# Patient Record
Sex: MALE | Race: WHITE
[De-identification: names, ages, dates, MRNs, and addresses within clinical notes are randomized per-mention and may not be internally consistent; named-entity substitution may affect disease eponyms.]

---

## 2019-08-07 ENCOUNTER — HOSPITAL ENCOUNTER (OUTPATIENT)
Dept: HOSPITAL 88 - ER | Age: 44
Setting detail: OBSERVATION
LOS: 2 days | Discharge: HOME | End: 2019-08-09
Attending: INTERNAL MEDICINE | Admitting: INTERNAL MEDICINE
Payer: COMMERCIAL

## 2019-08-07 VITALS — SYSTOLIC BLOOD PRESSURE: 104 MMHG | DIASTOLIC BLOOD PRESSURE: 68 MMHG

## 2019-08-07 VITALS — WEIGHT: 146.56 LBS | BODY MASS INDEX: 21.71 KG/M2 | HEIGHT: 69 IN

## 2019-08-07 DIAGNOSIS — I34.0: ICD-10-CM

## 2019-08-07 DIAGNOSIS — R07.89: Primary | ICD-10-CM

## 2019-08-07 DIAGNOSIS — M94.0: ICD-10-CM

## 2019-08-07 DIAGNOSIS — J98.4: ICD-10-CM

## 2019-08-07 DIAGNOSIS — M19.90: ICD-10-CM

## 2019-08-07 DIAGNOSIS — F17.210: ICD-10-CM

## 2019-08-07 DIAGNOSIS — R10.13: ICD-10-CM

## 2019-08-07 DIAGNOSIS — Z87.898: ICD-10-CM

## 2019-08-07 DIAGNOSIS — K20.9: ICD-10-CM

## 2019-08-07 LAB
ALBUMIN SERPL-MCNC: 4.7 G/DL (ref 3.5–5)
ALBUMIN/GLOB SERPL: 1.5 {RATIO} (ref 0.8–2)
ALP SERPL-CCNC: 51 IU/L (ref 40–150)
ALT SERPL-CCNC: 11 IU/L (ref 0–55)
ANION GAP SERPL CALC-SCNC: 15 MMOL/L (ref 8–16)
BACTERIA URNS QL MICRO: (no result) /HPF
BASOPHILS # BLD AUTO: 0 10*3/UL (ref 0–0.1)
BASOPHILS NFR BLD AUTO: 0.4 % (ref 0–1)
BILIRUB UR QL: (no result)
BUN SERPL-MCNC: 12 MG/DL (ref 7–26)
BUN/CREAT SERPL: 11 (ref 6–25)
CALCIUM SERPL-MCNC: 10.1 MG/DL (ref 8.4–10.2)
CHLORIDE SERPL-SCNC: 101 MMOL/L (ref 98–107)
CK MB SERPL-MCNC: 1.2 NG/ML (ref 0–5)
CK MB SERPL-MCNC: 1.4 NG/ML (ref 0–5)
CK SERPL-CCNC: 53 IU/L (ref 30–200)
CK SERPL-CCNC: 66 IU/L (ref 30–200)
CLARITY UR: (no result)
CO2 SERPL-SCNC: 25 MMOL/L (ref 22–29)
COLOR UR: (no result)
CRYSTALS URNS QL MICRO: PRESENT
DEPRECATED APTT PLAS QN: 36 SECONDS (ref 23.8–35.5)
DEPRECATED INR PLAS: 0.9
DEPRECATED NEUTROPHILS # BLD AUTO: 4.3 10*3/UL (ref 2.1–6.9)
DEPRECATED RBC URNS MANUAL-ACNC: (no result) /HPF (ref 0–5)
EGFRCR SERPLBLD CKD-EPI 2021: > 60 ML/MIN (ref 60–?)
EOSINOPHIL # BLD AUTO: 0.1 10*3/UL (ref 0–0.4)
EOSINOPHIL NFR BLD AUTO: 0.7 % (ref 0–6)
EPI CELLS URNS QL MICRO: (no result) /LPF
ERYTHROCYTE [DISTWIDTH] IN CORD BLOOD: 13.2 % (ref 11.7–14.4)
GLOBULIN PLAS-MCNC: 3.2 G/DL (ref 2.3–3.5)
GLUCOSE SERPLBLD-MCNC: 84 MG/DL (ref 74–118)
HCT VFR BLD AUTO: 43.7 % (ref 38.2–49.6)
HGB BLD-MCNC: 14.7 G/DL (ref 14–18)
KETONES UR QL STRIP.AUTO: NEGATIVE
LEUKOCYTE ESTERASE UR QL STRIP.AUTO: NEGATIVE
LYMPHOCYTES # BLD: 2.2 10*3/UL (ref 1–3.2)
LYMPHOCYTES NFR BLD AUTO: 30.7 % (ref 18–39.1)
MCH RBC QN AUTO: 30 PG (ref 28–32)
MCHC RBC AUTO-ENTMCNC: 33.6 G/DL (ref 31–35)
MCV RBC AUTO: 89.2 FL (ref 81–99)
MONOCYTES # BLD AUTO: 0.5 10*3/UL (ref 0.2–0.8)
MONOCYTES NFR BLD AUTO: 7 % (ref 4.4–11.3)
NEUTS SEG NFR BLD AUTO: 60.9 % (ref 38.7–80)
NITRITE UR QL STRIP.AUTO: NEGATIVE
PLATELET # BLD AUTO: 206 X10E3/UL (ref 140–360)
POTASSIUM SERPL-SCNC: 4 MMOL/L (ref 3.5–5.1)
PROT UR QL STRIP.AUTO: (no result)
PROTHROMBIN TIME: 12.6 SECONDS (ref 11.9–14.5)
RBC # BLD AUTO: 4.9 X10E6/UL (ref 4.3–5.7)
SODIUM SERPL-SCNC: 137 MMOL/L (ref 136–145)
SP GR UR STRIP: 1.02 (ref 1.01–1.02)
UROBILINOGEN UR STRIP-MCNC: 0.2 MG/DL (ref 0.2–1)
WBC #/AREA URNS HPF: (no result) /HPF (ref 0–5)

## 2019-08-07 PROCEDURE — 86039 ANTINUCLEAR ANTIBODIES (ANA): CPT

## 2019-08-07 PROCEDURE — 85025 COMPLETE CBC W/AUTO DIFF WBC: CPT

## 2019-08-07 PROCEDURE — 82550 ASSAY OF CK (CPK): CPT

## 2019-08-07 PROCEDURE — 85379 FIBRIN DEGRADATION QUANT: CPT

## 2019-08-07 PROCEDURE — 80048 BASIC METABOLIC PNL TOTAL CA: CPT

## 2019-08-07 PROCEDURE — 93005 ELECTROCARDIOGRAM TRACING: CPT

## 2019-08-07 PROCEDURE — 81001 URINALYSIS AUTO W/SCOPE: CPT

## 2019-08-07 PROCEDURE — 84484 ASSAY OF TROPONIN QUANT: CPT

## 2019-08-07 PROCEDURE — 82553 CREATINE MB FRACTION: CPT

## 2019-08-07 PROCEDURE — 80053 COMPREHEN METABOLIC PANEL: CPT

## 2019-08-07 PROCEDURE — 85730 THROMBOPLASTIN TIME PARTIAL: CPT

## 2019-08-07 PROCEDURE — 86431 RHEUMATOID FACTOR QUANT: CPT

## 2019-08-07 PROCEDURE — 87086 URINE CULTURE/COLONY COUNT: CPT

## 2019-08-07 PROCEDURE — 71275 CT ANGIOGRAPHY CHEST: CPT

## 2019-08-07 PROCEDURE — 85651 RBC SED RATE NONAUTOMATED: CPT

## 2019-08-07 PROCEDURE — 93306 TTE W/DOPPLER COMPLETE: CPT

## 2019-08-07 PROCEDURE — 71045 X-RAY EXAM CHEST 1 VIEW: CPT

## 2019-08-07 PROCEDURE — 80061 LIPID PANEL: CPT

## 2019-08-07 PROCEDURE — 83880 ASSAY OF NATRIURETIC PEPTIDE: CPT

## 2019-08-07 PROCEDURE — 96374 THER/PROPH/DIAG INJ IV PUSH: CPT

## 2019-08-07 PROCEDURE — 85610 PROTHROMBIN TIME: CPT

## 2019-08-07 PROCEDURE — 36415 COLL VENOUS BLD VENIPUNCTURE: CPT

## 2019-08-07 PROCEDURE — 93017 CV STRESS TEST TRACING ONLY: CPT

## 2019-08-07 PROCEDURE — 99284 EMERGENCY DEPT VISIT MOD MDM: CPT

## 2019-08-07 RX ADMIN — PHENOBARBITAL ELIXIR SCH ML: 16.2; .1037; .0065; .0194 ELIXIR ORAL at 17:32

## 2019-08-07 RX ADMIN — FAMOTIDINE SCH MG: 10 INJECTION, SOLUTION INTRAVENOUS at 22:58

## 2019-08-07 RX ADMIN — Medication PRN MG: at 23:40

## 2019-08-07 RX ADMIN — SODIUM CHLORIDE PRN MG: 900 INJECTION INTRAVENOUS at 23:40

## 2019-08-07 NOTE — NUR
PT ARRIVED TO ROOM 109 BY STRETCHER FROM ER. PT IS AAOX3, RR EVEN AND NON-LABORED, ON ROOM 
AIR. PT REPORTS PAIN TO ANTERIOR CHEST. PT ASSISTED TO AMBULATE TO BED, ORIENTED PT TO 
HOSPITAL ROOM, CALL LIGHT, PHONE, BED CONTROLS AND LIGHTS. LEFT PT LAYING SEMI FOWLERS IN 
BED, BED IN LOW LOCKED POSITION, SIDE RAILS UPX2, CALL LIGHT AND PHONE WITHIN REACH.

## 2019-08-07 NOTE — XMS REPORT
Patient Summary Document

                             Created on: 2019



TESSA PINTO

External Reference #: [id number]

: 1975

Sex: Male



Demographics







                          Address                   [address] [place]  [address]

 

                          Home Phone                [phone]

 

                          Preferred Language        Unknown

 

                          Marital Status            Unknown

 

                          Christianity Affiliation     Unknown

 

                          Race                      Unknown

 

                          Ethnic Group              Unknown





Author







                          Author                    [facility]

 

                          Address                   Unknown

 

                          Phone                     Unavailable







Support







                Name            Relationship    Address         Phone

 

                    CARLOS HELMS     PRS                 [address]  [phone]

 

                    CARLOS HELMS     PRS                 [address]  [phone]







Care Team Providers







                    Care Team Member Name    Role                Phone

 

                          Unavailable               Unavailable







Payers







             Payer Name    Policy Type    Policy Number    Effective Date    Expiration Date







Problems

This patient has no known problems.



Allergies, Adverse Reactions, Alerts







          Allergy Name    Allergy Type    Status    Severity    Reaction(s)    Onset Date    Inactive 

Date                      Treating Clinician        Comments

 

        No Known Allergies    DA      Active    U               2015 00:00:00                     







Medications

This patient has no known medications.



Results







           Test Description    Test Time    Test Comments    Text Results    Atomic Results    Result

 Comments

 

                COMPREHENSIVE METABOLIC PANEL    2019 22:11:00                      

 

   

 

                SODIUM (test code=NA)    138 mmol/L      135-148          

 

                POTASSIUM (test code=K)    3.8 mmol/L      3.5-5.1          

 

                CHLORIDE (test code=CL)    101 mmol/L      101-109          

 

                CARBON DIOXIDE (test code=CO2)    26.7 mmol/L     21-32            

 

                ANION GAP (test code=GAP)    14 mmol/L       10-20            

 

                GLUCOSE (test code=GLU)    112 mg/dL                  

 

                BLOOD UREA NITROGEN (test code=BUN)    14 mg/dL        3-21             

 

                CREATININE (test code=CREAT)    1.19 mg/dL      0.55-1.3         

 

                BUN/CREATININE RATIO (test code=BUN/CREA)    11.8            10-20            

 

                TOTAL PROTEIN (test code=PROT)    7.2 g/dL        6.5-8.4          

 

                ALBUMIN (test code=ALB)    3.1 g/dL        3.4-4.8          

 

                GLOBULIN (test code=GLOB)    4.1 G/DL        1-10             

 

                ALBUMIN/GLOBULIN RATIO (test code=A/G)    0.8 RATIO       0.75-1.50        

 

                CALCIUM (test code=CA)    9.2 mg/dL       8.4-10.2         

 

                BILIRUBIN TOTAL (test code=BILT)    0.40 mg/dL      0.0-1.0          

 

                SGOT/AST (test code=AST)    32 U/L          6-32             

 

                SGPT/ALT (test code=ALT)    35 U/L          12-78           Note: Change in REFERENCE RANGE due to 

new reagent method.

 

                ALKALINE PHOSPHATASE TOTAL (test code=ALKP)    72 U/L                     





COMPREHENSIVE METABOLIC [id number] 21:59:00* 





                Test Item       Value           Reference Range    Comments

 

                SODIUM (test code=NA)    138 mmol/L      135-148          

 

                POTASSIUM (test code=K)    3.8 mmol/L      3.5-5.1          

 

                CHLORIDE (test code=CL)    101 mmol/L      101-109          

 

                CARBON DIOXIDE (test code=CO2)    26.7 mmol/L     21-32            

 

                ANION GAP (test code=GAP)    14 mmol/L       10-20            

 

                GLUCOSE (test code=GLU)    112 mg/dL                  

 

                BLOOD UREA NITROGEN (test code=BUN)    14 mg/dL        3-21             

 

                CREATININE (test code=CREAT)    1.19 mg/dL      0.55-1.3         

 

                BUN/CREATININE RATIO (test code=BUN/CREA)    11.8            10-20            

 

                TOTAL PROTEIN (test code=PROT)     gram/dL        6.4-8.2          

 

                ALBUMIN (test code=ALB)     g/dL           3.4-5.0          

 

                GLOBULIN (test code=GLOB)     g/dL           2.7-4.2          

 

                ALBUMIN/GLOBULIN RATIO (test code=A/G)                    0.75-1.50        

 

                CALCIUM (test code=CA)    9.2 mg/dL       8.4-10.2         

 

                BILIRUBIN TOTAL (test code=BILT)     mg/dL          0.2-1.2          

 

                SGOT/AST (test code=AST)     IUnit/L        15-37            

 

                SGPT/ALT (test code=ALT)     U/L            10-69            

 

                ALKALINE PHOSPHATASE TOTAL (test code=ALKP)     IUnit/L                   





URINALYSIS [id number] 21:49:00* 





                Test Item       Value           Reference Range    Comments

 

                UA COLOR (test code=COLU)    YELLOW          YELLOW           

 

                UA APPEARANCE (test code=APPU)    HAZY            CLEAR            

 

                UA GLUCOSE DIPSTICK (test code=DGLUU)    NORMAL mg/dL    NEGATIVE         

 

                UA BILIRUBIN DIPSTICK (test code=BILU)    1 mg/dL (1+) mg/dL    NEGATIVE         

 

                UA KETONE DIPSTICK (test code=KETU)    50 (2+) mg/dL    NEGATIVE         

 

                UA SPECIFIC GRAVITY (test code=SGU)    1.015           1.001-1.035      

 

                UA BLOOD DIPSTICK (test code=MARIETTA)    150 (3+) John/uL    NEGATIVE         

 

                UA PH DIPSTICK (test code=ANT)    5.0             5.0-8.0          

 

                UA PROTEIN DIPSTICK (test code=PROU)    100 (2+) mg/dL    Neg-15           

 

                UA UROBILINIOGEN DIPSTICK (test code=URO)    1 mg/dL         0.0-0.2          

 

                UA NITRITE DIPSTICK (test code=WANDA)    POSITIVE        NEGATIVE         

 

                UA LEUKOCYTE ESTERASE DIPSTICK (test code=LEUU)    500/uL (3+) uL    NEGATIVE         

 

                UA WBC (test code=WBCU)    40-50 per HPF    0-5             IN SOME URINARY TRACT INFECTIONS THERE

 MAY NOT BE ENOUGHWBCs IN THE URINE TO TRIGGER AN AUTOMATIC (REFLEX) 
URINECULTURE. A SEPERATE ORDER FOR URINE CULTURE IS RECOMMENDEDIF THERE IS 
STRONG SUPPORT FOR A URINARY TRACT INFECTIONCLINICALLY. 

 

                UA RBC (test code=RBCU)    3-5 per HPF     0-5              

 

                UA EPITHELIAL CELLS (test code=EPIU)    Rare (0-1/hpf) per HPF    Few              

 

                UA BACTERIA (test code=BACU)    LOADED per HPF    NONE             

 

                UA MUCUS (test code=MUCU)    MODERATE per LPF    NONE-FEW         





Urine Source? Clean Catch- CT ABD PELVIS W/O [id number] 21:49:00  Name: 
TESSA PINTO                      [facility]     : 
1975 Age/S: 43  / M         [address] [facility]           Unit #: [id number]     Loc:               [place] [address]                Phys: Jarek Sierra MD                                                   Acct: [id number]  Di
s Date:               Status: PRE ER                                  PHONE #: [phone]
[phone]     Exam Date: 2019                     FAX #: [phone]
[phone]      Reason: R flank pain                                        EXAMS:     
                                         CPT CODE:      [id number] CT ABD PELVIS 
W/O CONT                     [id number]                            REASON FOR EXAM: R
flank pain               EXAM ORDER DATE: 2019 9:17 PM               Order
regina MARTIN: Yovany Sierra MD               PROCEDURE:  - CT ABD PELVIS W/O CONT  
            COMPARISON:               FINDINGS: CT images of the abdomen and pe
lvis were obtained without IV       and without oral contrast at 5mm. Dose reduc
tion techniques were       applied               The liver, spleen, pancreas are
grossly within normal limits.        The gallbladder is unremarkable by CT.     
         The right kidney is unremarkable.  The urinary bladder is contracted.  
            The colon, small bowel, and stomach are within normal limits without
      evidence of obstruction.                No evidence of free air or free 
fluid.                 IMPRESSION: Multiple punctate (0.1-0.3 cm) left renal s
tones.  No         evidence of radiopaque stone seen on the right.  No evidence 
of         hydronephrosis or radiopaque stone noted along the course of the     
   ureters          ** Electronically Signed by MARIO Pandey on 2019 at 2
149 **                      Reported and signed by: Jan Pandey M.D.          CC: Yovany Jay MD                                                                
                                                      Technologist:HÉCTOR CAMACHO RT(R),CT      CTDI:        DLP:        Trnscb Date/Time: 2019 ()
t.SDR.VTL                        Orig Print D/T: S: 2019 ()       CTD
I:          DLP:          PAGE  1                       Signed Report           
                   CBC W/AUTO [id number] 21:42:00* 





                Test Item       Value           Reference Range    Comments

 

                WHITE BLOOD CELL (test code=WBC)    22.0 K/mm3      4.5-12.5         

 

                RED BLOOD CELL (test code=RBC)    4.22 mill/mm3    4.0-5.8          

 

                HEMOGLOBIN (test code=HGB)    13.1 gram/dL    13.0-17.5        

 

                HEMATOCRIT (test code=HCT)    39.9 %          42.0-52.0        

 

                MEAN CELL VOLUME (test code=MCV)    94.5 fL         80-98            

 

                MEAN CELL HGB (test code=MCH)    31.0 picogram    27.0-33.0        

 

                MEAN CELL HGB CONCETRATION (test code=MCHC)    32.8 gram/dL    33.0-36.0        

 

                RED CELL DISTRIBUTION WIDTH (test code=RDW)    13.6 %          11.6-16.2        

 

                RED CELL DISTRIBUTION WIDTH SD (test code=RDW-SD)    45.5 fL         39.2-49.5        

 

                PLATELET COUNT (test code=PLT)    226 K/mm3       150-450          

 

                MEAN PLATELET VOLUME (test code=MPV)    11.3 fL         6.7-11.0         

 

                NEUTROPHIL % (test code=NT%)    82.9 %          39.0-69.0        

 

                LYMPHOCYTE % (test code=LY%)    6.6 %           25.0-55.0        

 

                MONOCYTE % (test code=MO%)    10.4 %          0.0-10.0         

 

                EOSINOPHIL % (test code=EO%)    0.0 %           0.0-5.0          

 

                BASOPHIL % (test code=BA%)    0.1 %           0.0-1.0          

 

                NEUTROPHIL # (test code=NT#)    18.27 K/mm3     1.8-7.7          

 

                LYMPHOCYTE # (test code=LY#)    1.45 K/mm3      1.0-5.0          

 

                MONOCYTE # (test code=MO#)    2.29 K/mm3      0-0.8            

 

                EOSINOPHIL # (test code=EO#)    0.01 K/mm3      0.0-0.5          

 

                BASOPHIL # (test code=BA#)    0.02 K/mm3      0.0-0.2          

 

                MANUAL DIFF REQUIRED (test code=MDIFF)    NO                               





URINALYSIS [id number] 21:38:00* 





                Test Item       Value           Reference Range    Comments

 

                UA COLOR (test code=COLU)    YELLOW          YELLOW           

 

                UA APPEARANCE (test code=APPU)    HAZY            CLEAR            

 

                UA GLUCOSE DIPSTICK (test code=DGLUU)    NORMAL mg/dL    NEGATIVE         

 

                UA BILIRUBIN DIPSTICK (test code=BILU)    1 mg/dL (1+) mg/dL    NEGATIVE         

 

                UA KETONE DIPSTICK (test code=KETU)    50 (2+) mg/dL    NEGATIVE         

 

                UA SPECIFIC GRAVITY (test code=SGU)    1.015           1.001-1.035      

 

                UA BLOOD DIPSTICK (test code=MARIETTA)    150 (3+) John/uL    NEGATIVE         

 

                UA PH DIPSTICK (test code=ANT)    5.0             5.0-8.0          

 

                UA PROTEIN DIPSTICK (test code=PROU)    100 (2+) mg/dL    Neg-15           

 

                UA UROBILINIOGEN DIPSTICK (test code=URO)    1 mg/dL         0.0-0.2          

 

                UA NITRITE DIPSTICK (test code=WANDA)    POSITIVE        NEGATIVE         

 

                UA LEUKOCYTE ESTERASE DIPSTICK (test code=LEUU)    500/uL (3+) uL    NEGATIVE         

 

                UA WBC (test code=WBCU)     per HPF        0-5              





Urine Source? Clean Catch

## 2019-08-08 VITALS — DIASTOLIC BLOOD PRESSURE: 63 MMHG | SYSTOLIC BLOOD PRESSURE: 104 MMHG

## 2019-08-08 VITALS — SYSTOLIC BLOOD PRESSURE: 116 MMHG | DIASTOLIC BLOOD PRESSURE: 67 MMHG

## 2019-08-08 VITALS — DIASTOLIC BLOOD PRESSURE: 74 MMHG | SYSTOLIC BLOOD PRESSURE: 110 MMHG

## 2019-08-08 VITALS — SYSTOLIC BLOOD PRESSURE: 104 MMHG | DIASTOLIC BLOOD PRESSURE: 63 MMHG

## 2019-08-08 VITALS — SYSTOLIC BLOOD PRESSURE: 105 MMHG | DIASTOLIC BLOOD PRESSURE: 60 MMHG

## 2019-08-08 VITALS — DIASTOLIC BLOOD PRESSURE: 68 MMHG | SYSTOLIC BLOOD PRESSURE: 104 MMHG

## 2019-08-08 VITALS — SYSTOLIC BLOOD PRESSURE: 108 MMHG | DIASTOLIC BLOOD PRESSURE: 62 MMHG

## 2019-08-08 LAB
ANION GAP SERPL CALC-SCNC: 14.3 MMOL/L (ref 8–16)
BASOPHILS # BLD AUTO: 0 10*3/UL (ref 0–0.1)
BASOPHILS NFR BLD AUTO: 0.5 % (ref 0–1)
BUN SERPL-MCNC: 12 MG/DL (ref 7–26)
BUN/CREAT SERPL: 11 (ref 6–25)
CALCIUM SERPL-MCNC: 9.7 MG/DL (ref 8.4–10.2)
CHLORIDE SERPL-SCNC: 103 MMOL/L (ref 98–107)
CHOLEST SERPL-MCNC: 167 MD/DL (ref 0–199)
CHOLEST/HDLC SERPL: 4.1 {RATIO} (ref 3.9–4.7)
CK MB SERPL-MCNC: 0.7 NG/ML (ref 0–5)
CK MB SERPL-MCNC: 2.6 NG/ML (ref 0–5)
CK SERPL-CCNC: 113 IU/L (ref 30–200)
CK SERPL-CCNC: 38 IU/L (ref 30–200)
CO2 SERPL-SCNC: 23 MMOL/L (ref 22–29)
DEPRECATED NEUTROPHILS # BLD AUTO: 5.2 10*3/UL (ref 2.1–6.9)
EGFRCR SERPLBLD CKD-EPI 2021: > 60 ML/MIN (ref 60–?)
EOSINOPHIL # BLD AUTO: 0.1 10*3/UL (ref 0–0.4)
EOSINOPHIL NFR BLD AUTO: 1 % (ref 0–6)
ERYTHROCYTE [DISTWIDTH] IN CORD BLOOD: 13.2 % (ref 11.7–14.4)
GLUCOSE SERPLBLD-MCNC: 95 MG/DL (ref 74–118)
HCT VFR BLD AUTO: 43.3 % (ref 38.2–49.6)
HDLC SERPL-MSCNC: 41 MG/DL (ref 40–60)
HGB BLD-MCNC: 14.6 G/DL (ref 14–18)
LDLC SERPL CALC-MCNC: 104 MG/DL (ref 60–130)
LYMPHOCYTES # BLD: 2.2 10*3/UL (ref 1–3.2)
LYMPHOCYTES NFR BLD AUTO: 26.5 % (ref 18–39.1)
MCH RBC QN AUTO: 30 PG (ref 28–32)
MCHC RBC AUTO-ENTMCNC: 33.7 G/DL (ref 31–35)
MCV RBC AUTO: 89.1 FL (ref 81–99)
MONOCYTES # BLD AUTO: 0.7 10*3/UL (ref 0.2–0.8)
MONOCYTES NFR BLD AUTO: 8.9 % (ref 4.4–11.3)
NEUTS SEG NFR BLD AUTO: 62.7 % (ref 38.7–80)
PLATELET # BLD AUTO: 199 X10E3/UL (ref 140–360)
POTASSIUM SERPL-SCNC: 4.3 MMOL/L (ref 3.5–5.1)
RBC # BLD AUTO: 4.86 X10E6/UL (ref 4.3–5.7)
SODIUM SERPL-SCNC: 136 MMOL/L (ref 136–145)
TRIGL SERPL-MCNC: 110 MG/DL (ref 0–149)

## 2019-08-08 RX ADMIN — SUCRALFATE SCH GM: 1 TABLET ORAL at 20:04

## 2019-08-08 RX ADMIN — FAMOTIDINE SCH MG: 10 INJECTION, SOLUTION INTRAVENOUS at 20:04

## 2019-08-08 RX ADMIN — SUCRALFATE SCH GM: 1 TABLET ORAL at 16:30

## 2019-08-08 RX ADMIN — SODIUM CHLORIDE PRN MG: 900 INJECTION INTRAVENOUS at 07:23

## 2019-08-08 RX ADMIN — SUCRALFATE SCH GM: 1 TABLET ORAL at 12:19

## 2019-08-08 RX ADMIN — Medication PRN MG: at 20:05

## 2019-08-08 RX ADMIN — PHENOBARBITAL ELIXIR SCH ML: 16.2; .1037; .0065; .0194 ELIXIR ORAL at 20:04

## 2019-08-08 RX ADMIN — Medication PRN MG: at 03:40

## 2019-08-08 RX ADMIN — ASPIRIN SCH MG: 81 TABLET, COATED ORAL at 08:22

## 2019-08-08 RX ADMIN — PHENOBARBITAL ELIXIR SCH ML: 16.2; .1037; .0065; .0194 ELIXIR ORAL at 08:22

## 2019-08-08 RX ADMIN — BENZOCAINE AND MENTHOL PRN: 15; 3.6 LOZENGE ORAL at 11:30

## 2019-08-08 RX ADMIN — FAMOTIDINE SCH MG: 10 INJECTION, SOLUTION INTRAVENOUS at 07:57

## 2019-08-08 RX ADMIN — PHENOBARBITAL ELIXIR SCH ML: 16.2; .1037; .0065; .0194 ELIXIR ORAL at 14:43

## 2019-08-08 RX ADMIN — SODIUM CHLORIDE PRN MG: 900 INJECTION INTRAVENOUS at 09:59

## 2019-08-08 RX ADMIN — Medication PRN MG: at 16:55

## 2019-08-08 RX ADMIN — SODIUM CHLORIDE PRN MG: 900 INJECTION INTRAVENOUS at 03:40

## 2019-08-08 RX ADMIN — Medication PRN MG: at 07:04

## 2019-08-08 RX ADMIN — Medication PRN MG: at 09:59

## 2019-08-08 RX ADMIN — Medication PRN MG: at 13:15

## 2019-08-08 RX ADMIN — Medication PRN MG: at 23:17

## 2019-08-08 NOTE — CONSULTATION
DATE OF CONSULTATION:  08/08/2019

 

Cardiology Consultation

 

Thank you so much for asking me to see this nice man in consultation. 

 

Mr. Crawford is a 43-year-old man, who presents to the emergency room with a complaint of

chest discomfort. 

 

HISTORY OF PRESENT ILLNESS:  The patient reports this has been going on for about 3 days

that his chest feels very heavy and actually hard to take a deep breath.  He has not

tried any medications for this.  He has also noted that his throat hurts and that it

hurts to swallow.  He denies any fever. 

 

PAST MEDICAL HISTORY:  Significant for a spontaneous pneumothorax 10 or so years

earlier, treated with chest tube and resolution without recurrence.  He had a repair of

anterior cruciate ligament years ago.  He reports he has chronic pain in his back and

sees a pain medicine doctor, for which he takes Norco. 

 

PERSONAL AND SOCIAL HISTORY:  The patient has been smoking since his teenage years.  He

reports he does not drink alcohol. 

 

REVIEW OF SYSTEMS:

CARDIAC:  He knows of no cardiac problem in the past. 

PULMONARY:  He reports he occasionally hear some wheezing.

 

FAMILY HISTORY:  Reports both parents are alive and well.  He has 2 sisters, who seemed

to be well. 

 

PHYSICAL EXAMINATION:

GENERAL:  At this time shows a pleasant, alert, white man.  He is alert and responsive.

He has widespread tattoos over his chest and arms. 

HEAD, EYES, EARS, NOSE, AND THROAT:  Unremarkable. 

VITAL SIGNS:  He is afebrile, normotensive. 

NECK:  No jugular venous distention. 

THORAX:  Heart sounds S1 and S2 are equal.  No murmurs or rubs. 

LUNGS:  Have faint pops and crackles.  Chest wall is tender to palpation on both sides. 

ABDOMEN:  Normal bowel sounds.  Nontender. 

EXTREMITIES:  No cyanosis, clubbing, or edema.

LABORATORY DATA:  EKG is unremarkable.  Cardiac enzymes are normal.  BNP is 15.

Cholesterol 167 and triglycerides 110.  Basic chemistries are normal.  CBC is

unremarkable. 

 

ASSESSMENT:  

1. Costochondritis.

2. Esophagitis.

3. Lung disease with history of smoking.

4. History of spontaneous pneumothorax.

 

PLAN:  We will give the patient Solu-Medrol and Carafate.  I will check echocardiogram

to exclude any pericardial involvement and Carafate, and we will plan stress test in the

morning if he remains otherwise stable. 

 

Thank you for asking me to see him in consultation.

 

 

 

 

______________________________

MD TONY Jaquez/SALTY

D:  08/08/2019 11:45:51

T:  08/08/2019 14:41:34

Job #:  [id number]

 

cc:            Rashaun Inman MD

## 2019-08-08 NOTE — NUR
BEDSIDE SHIFT REPORT PERFORMED, RECEIVED PT LAYING SEMI FOWLERS IN BED, AAOX3, RR EVEN AND 
NON-LABORED, ON ROOM AIR. NO S/SX OF DISTRESS NOTED. LEFT PT LAYING SEMI FOWLERS IN BED, BED 
IN LOW LOCKED POSITION, SIDE RAILS UPX2, CALL LIGHT AND PHONE WITHIN REACH.

## 2019-08-08 NOTE — NUR
Patient c/o throat pain, [place] given, rounds by attending and consult to Cardiology and 
notified of consult, will see patient today.

## 2019-08-08 NOTE — NUR
Patient alert and responsive, no distress at this time, atypical chest pains and managed 
with meds as ordered. Call light within reach and will monitor.

## 2019-08-08 NOTE — NUR
Rounds by cardiologist and orders in place for stress test tomorrow, orders for muscle 
athralgias and GI prophylaxis. Will obtain consent for stress test tomorrow.

## 2019-08-08 NOTE — HISTORY AND PHYSICAL
CHIEF COMPLAINT:  Severe chest pain, epigastric pain with burning sensation in his

throat. 

 

HISTORY OF PRESENT ILLNESS:  This 43 years old male with no previous history of reflux

or chest pain, came in with severe chest pain, pressure-like extensively.  The patient

was worried, came to the hospital.  Here, the patient had multiple GI cocktail, but

remained with symptoms.  The patient is otherwise stable.  CTA of the chest showed

negative for any vessel abnormality.  Chest x-ray is unremarkable.  The patient is

stable, placed on observation. 

 

PAST MEDICAL HISTORY:  Noncontributory.

 

PAST SURGICAL HISTORY:  Noncontributory.

 

SOCIAL HISTORY:  The patient is a smoker.  No alcohol consumption.

 

ALLERGIES:  NO KNOWN ALLERGIES.

 

HOME MEDICATIONS:  List is reviewed.  Norco p.r.n. for pain.

 

PHYSICAL EXAMINATION:

VITAL SIGNS:  Temperature is 98, blood pressure 110/62, pulse rate 80, and respirations

18. 

GENERAL:  The patient is not in acute distress. 

HEENT:  Normocephalic, atraumatic.  Pupils are reactive.  Anicteric. 

NECK:  Supple grossly. 

PULMONARY:  Clear. 

CARDIOVASCULAR:  Regular rate and rhythm. 

ABDOMEN:  Soft and unremarkable. 

EXTREMITIES:  No cyanosis or edema. 

NEUROLOGIC:  No gross focal deficit.

LABORATORY DATA:  WBCs 8, hemoglobin is 14.6, hematocrit of 43.3, and platelets are 199. 

 

Chemistry; sodium is 136, potassium 4.3, chloride 103, bicarb 23, BUN 12, creatinine

1.06, and glucose is 50. 

 

RADIOGRAPHIC DATA:  CTA of the chest negative. 

 

Chest x-ray negative.

 

IMPRESSION:  

1. Atypical chest pain.

2. Possible reflux.

3. Heavy smoker.

 

PLAN:  Consultation with Dr. Bob Chang for cardiac stress test.  Echocardiogram.  We

will follow up on the patient's status. 

 

 

 

 

______________________________

MD CHARITY Thornton/SALTY

D:  08/08/2019 09:42:55

T:  08/08/2019 16:04:59

Job #:  [id number]

## 2019-08-09 VITALS — SYSTOLIC BLOOD PRESSURE: 106 MMHG | DIASTOLIC BLOOD PRESSURE: 62 MMHG

## 2019-08-09 VITALS — SYSTOLIC BLOOD PRESSURE: 104 MMHG | DIASTOLIC BLOOD PRESSURE: 63 MMHG

## 2019-08-09 VITALS — DIASTOLIC BLOOD PRESSURE: 57 MMHG | SYSTOLIC BLOOD PRESSURE: 100 MMHG

## 2019-08-09 RX ADMIN — FAMOTIDINE SCH MG: 10 INJECTION, SOLUTION INTRAVENOUS at 07:52

## 2019-08-09 RX ADMIN — PHENOBARBITAL ELIXIR SCH ML: 16.2; .1037; .0065; .0194 ELIXIR ORAL at 09:00

## 2019-08-09 RX ADMIN — BENZOCAINE AND MENTHOL PRN: 15; 3.6 LOZENGE ORAL at 03:27

## 2019-08-09 RX ADMIN — Medication PRN MG: at 07:52

## 2019-08-09 RX ADMIN — SUCRALFATE SCH GM: 1 TABLET ORAL at 07:30

## 2019-08-09 RX ADMIN — Medication PRN MG: at 03:28

## 2019-08-09 RX ADMIN — ASPIRIN SCH MG: 81 TABLET, COATED ORAL at 09:00

## 2019-08-09 NOTE — NUR
Patient discharged from facility to home. Patient assisted out via staff. Removed IV. 
Pressure dressing applied. Reviewed all discharge paperwork, follow up appts and RX's given.

## 2019-08-12 NOTE — EXERCISE STRESS TEST
DATE OF STUDY:  08/09/2019 10:00:00

 

Stress Test - Treadmill ONLY

 

STUDY FINDINGS:  The patient exercised on Benja protocol for a total of 9 minutes and 45

seconds.  He reached near 85% of age predicted maximum and requested the treadmill to be

stopped evidently for fatigue.  There was no EKG change.  No chest pain.  There was no

arrhythmia.  Blood pressure response was normal. 

 

FINAL IMPRESSIONS:  

1. Negative EKG stress test for ischemia.

2. No chest pain.

3. No arrhythmia.

4. Normal blood pressure response.

5. Normal stress test.

 

 

 

 

______________________________

MD TONY Jaquez/MODL

D:  08/12/2019 11:51:41

T:  08/12/2019 12:32:36

Job #:  [id number]

 

cc:            Jamey Winter MD

## 2020-12-23 ENCOUNTER — HOSPITAL ENCOUNTER (OUTPATIENT)
Dept: HOSPITAL 88 - OR | Age: 45
Discharge: HOME | End: 2020-12-23
Attending: INTERNAL MEDICINE
Payer: COMMERCIAL

## 2020-12-23 VITALS — DIASTOLIC BLOOD PRESSURE: 88 MMHG | SYSTOLIC BLOOD PRESSURE: 122 MMHG

## 2020-12-23 DIAGNOSIS — Z01.812: ICD-10-CM

## 2020-12-23 DIAGNOSIS — K21.9: ICD-10-CM

## 2020-12-23 DIAGNOSIS — Z01.810: ICD-10-CM

## 2020-12-23 DIAGNOSIS — K63.5: ICD-10-CM

## 2020-12-23 DIAGNOSIS — D72.820: ICD-10-CM

## 2020-12-23 DIAGNOSIS — K62.89: ICD-10-CM

## 2020-12-23 DIAGNOSIS — Z20.828: ICD-10-CM

## 2020-12-23 DIAGNOSIS — K51.50: Primary | ICD-10-CM

## 2020-12-23 DIAGNOSIS — K64.8: ICD-10-CM

## 2020-12-23 DIAGNOSIS — Z87.891: ICD-10-CM

## 2020-12-23 LAB — LACTOFERRIN STL QL: NEGATIVE

## 2020-12-23 PROCEDURE — 87045 FECES CULTURE AEROBIC BACT: CPT

## 2020-12-23 PROCEDURE — 85651 RBC SED RATE NONAUTOMATED: CPT

## 2020-12-23 PROCEDURE — 87177 OVA AND PARASITES SMEARS: CPT

## 2020-12-23 PROCEDURE — 86256 FLUORESCENT ANTIBODY TITER: CPT

## 2020-12-23 PROCEDURE — 45378 DIAGNOSTIC COLONOSCOPY: CPT

## 2020-12-23 PROCEDURE — 87328 CRYPTOSPORIDIUM AG IA: CPT

## 2020-12-23 PROCEDURE — 83630 LACTOFERRIN FECAL (QUAL): CPT

## 2020-12-23 PROCEDURE — 45380 COLONOSCOPY AND BIOPSY: CPT

## 2020-12-23 PROCEDURE — 87493 C DIFF AMPLIFIED PROBE: CPT

## 2020-12-23 PROCEDURE — 45384 COLONOSCOPY W/LESION REMOVAL: CPT

## 2020-12-23 PROCEDURE — 86140 C-REACTIVE PROTEIN: CPT

## 2020-12-23 PROCEDURE — 93005 ELECTROCARDIOGRAM TRACING: CPT

## 2020-12-23 PROCEDURE — 83993 ASSAY FOR CALPROTECTIN FECAL: CPT

## 2020-12-23 PROCEDURE — 86671 FUNGUS NES ANTIBODY: CPT

## 2020-12-23 PROCEDURE — 36415 COLL VENOUS BLD VENIPUNCTURE: CPT

## 2020-12-24 LAB — C DIFFICILE TOXIN A&B AMP PROB: NEGATIVE

## 2024-11-29 NOTE — DIAGNOSTIC IMAGING REPORT
EXAM: CTA Chest without and WITH contrast 8/7/2019 7:37 PM

INDICATION: Chest pain. Concern for aortic dissection. 

COMPARISON: None

TECHNIQUE:

Chest was scanned utilizing a multidetector helical scanner from the lung apex

through the level of the adrenal glands without and with administration of IV

contrast. Coronal and sagittal reformations were obtained. Dissection protocol

was performed. Postprocessing with volume rendering and 3-D reformatting was

performed on a separate workstation.

           IV CONTRAST: 100 cc [id number]

           RADIATION DOSE: Total DLP: 564.39 mGy*cm

            Estimated effective dose: (DLP x 0.014 x size factor) mSv

           COMPLICATIONS: None



FINDINGS:

   

LINES/ TUBES: None.



LUNGS AND AIRWAYS:  Mild biapical paraseptal emphysematous changes, right

greater then left. Mild biapical pleural-parenchymal scarring. 2 mm

noncalcified nodule in the right middle lobe on image 41. 2 mm noncalcified

nodule in the posterior right lower lobe on image 41. Bibasilar dependent

atelectasis. Two, 2 mm noncalcified nodules peripherally in the left lower lobe

on image 32. Airways are normal. No filling defects within the pulmonary

arterial system to suggest embolism.



PLEURA: The pleural spaces are clear.



HEART AND MEDIASTINUM: The thyroid gland is normal.  No mediastinal, hilar or

axillary lymphadenopathy.  The heart is normal in size.. There is no

pericardial effusion.  The thoracic aorta is normal in caliber without evidence

of dissection.



UPPER ABDOMEN: Limited non-contrast views of the upper abdomen show a punctate

nonobstructing carpus in the upper pole of the left kidney on image 57 series

2.. The adrenal glands are normal.



BONES: The visualized bony thorax is within normal limits.



SOFT TISSUES: Unremarkable.



IMPRESSION: 

No acute aortic abnormality. No evidence of dissection as per clinical query.





Signed by: Dr. SURESH Baires M.D. on 8/9/2019 2:38 AM
EXAMINATION:  CHEST SINGLE (NOT PORTABLE)    



INDICATION: Chest pain



COMPARISON: None

     

FINDINGS:



LINES/TUBES:None



LUNGS:The lungs are well-inflated. No focal consolidation or pulmonary edema.



PLEURA:No pleural effusion or pneumothorax.



MEDIASTINUM:The cardiomediastinal silhouette appears normal in size and shape.



BONES/SOFT TISSUES:No acute osseous injury.



ABDOMEN:No free air under the diaphragm.





IMPRESSION: 

No focal pneumonia or pulmonary edema.



Signed by: Emma Park MD on 8/7/2019 5:09 PM
Her/She